# Patient Record
Sex: MALE | Race: WHITE
[De-identification: names, ages, dates, MRNs, and addresses within clinical notes are randomized per-mention and may not be internally consistent; named-entity substitution may affect disease eponyms.]

---

## 2020-01-07 ENCOUNTER — HOSPITAL ENCOUNTER (EMERGENCY)
Dept: HOSPITAL 72 - EMR | Age: 55
Discharge: HOME | End: 2020-01-07
Payer: COMMERCIAL

## 2020-01-07 VITALS — DIASTOLIC BLOOD PRESSURE: 75 MMHG | SYSTOLIC BLOOD PRESSURE: 128 MMHG

## 2020-01-07 VITALS — DIASTOLIC BLOOD PRESSURE: 94 MMHG | SYSTOLIC BLOOD PRESSURE: 132 MMHG

## 2020-01-07 VITALS — BODY MASS INDEX: 26.48 KG/M2 | WEIGHT: 185 LBS | HEIGHT: 70 IN

## 2020-01-07 DIAGNOSIS — Z88.8: ICD-10-CM

## 2020-01-07 DIAGNOSIS — N20.0: Primary | ICD-10-CM

## 2020-01-07 LAB
ADD MANUAL DIFF: NO
ALBUMIN SERPL-MCNC: 3.2 G/DL (ref 3.4–5)
ALBUMIN/GLOB SERPL: 0.7 {RATIO} (ref 1–2.7)
ALP SERPL-CCNC: 65 U/L (ref 46–116)
ALT SERPL-CCNC: 29 U/L (ref 12–78)
ANION GAP SERPL CALC-SCNC: 4 MMOL/L (ref 5–15)
APPEARANCE UR: (no result)
APTT PPP: YELLOW S
AST SERPL-CCNC: 23 U/L (ref 15–37)
BASOPHILS NFR BLD AUTO: 0.8 % (ref 0–2)
BILIRUB SERPL-MCNC: 0.2 MG/DL (ref 0.2–1)
BUN SERPL-MCNC: 16 MG/DL (ref 7–18)
CALCIUM SERPL-MCNC: 8.3 MG/DL (ref 8.5–10.1)
CHLORIDE SERPL-SCNC: 108 MMOL/L (ref 98–107)
CO2 SERPL-SCNC: 29 MMOL/L (ref 21–32)
CREAT SERPL-MCNC: 1.1 MG/DL (ref 0.55–1.3)
EOSINOPHIL NFR BLD AUTO: 2.2 % (ref 0–3)
ERYTHROCYTE [DISTWIDTH] IN BLOOD BY AUTOMATED COUNT: 12.2 % (ref 11.6–14.8)
GLOBULIN SER-MCNC: 4.4 G/DL
GLUCOSE UR STRIP-MCNC: NEGATIVE MG/DL
HCT VFR BLD CALC: 43.2 % (ref 42–52)
HGB BLD-MCNC: 14.4 G/DL (ref 14.2–18)
KETONES UR QL STRIP: NEGATIVE
LEUKOCYTE ESTERASE UR QL STRIP: NEGATIVE
LYMPHOCYTES NFR BLD AUTO: 27.2 % (ref 20–45)
MCV RBC AUTO: 88 FL (ref 80–99)
MONOCYTES NFR BLD AUTO: 6 % (ref 1–10)
NEUTROPHILS NFR BLD AUTO: 63.9 % (ref 45–75)
NITRITE UR QL STRIP: NEGATIVE
PH UR STRIP: 6 [PH] (ref 4.5–8)
PLATELET # BLD: 306 K/UL (ref 150–450)
POTASSIUM SERPL-SCNC: 3.8 MMOL/L (ref 3.5–5.1)
PROT UR QL STRIP: (no result)
RBC # BLD AUTO: 4.93 M/UL (ref 4.7–6.1)
SODIUM SERPL-SCNC: 141 MMOL/L (ref 136–145)
SP GR UR STRIP: 1.02 (ref 1–1.03)
UROBILINOGEN UR-MCNC: NORMAL MG/DL (ref 0–1)
WBC # BLD AUTO: 8.1 K/UL (ref 4.8–10.8)

## 2020-01-07 PROCEDURE — 96375 TX/PRO/DX INJ NEW DRUG ADDON: CPT

## 2020-01-07 PROCEDURE — 85025 COMPLETE CBC W/AUTO DIFF WBC: CPT

## 2020-01-07 PROCEDURE — 81003 URINALYSIS AUTO W/O SCOPE: CPT

## 2020-01-07 PROCEDURE — 74176 CT ABD & PELVIS W/O CONTRAST: CPT

## 2020-01-07 PROCEDURE — 96361 HYDRATE IV INFUSION ADD-ON: CPT

## 2020-01-07 PROCEDURE — 83690 ASSAY OF LIPASE: CPT

## 2020-01-07 PROCEDURE — 99284 EMERGENCY DEPT VISIT MOD MDM: CPT

## 2020-01-07 PROCEDURE — 36415 COLL VENOUS BLD VENIPUNCTURE: CPT

## 2020-01-07 PROCEDURE — 80053 COMPREHEN METABOLIC PANEL: CPT

## 2020-01-07 PROCEDURE — 96374 THER/PROPH/DIAG INJ IV PUSH: CPT

## 2020-01-07 NOTE — EMERGENCY ROOM REPORT
History of Present Illness


General


Chief Complaint:  Abdominal Pain


Source:  Patient





Present Illness


HPI


Patient presents emergency department today complaining of cute onset of right 

flank pain radiating to the groin starting last night.  He states that he took 

an Advil last night symptoms became better and he was able to rest became very 

severe this morning awoke about.  He complains of urinary urgency.  Denies any 

hematuria.  He states that he actually has some abdominal pain and flank pain 

about a month ago at that time received CT scan was told that he might have a 

kidney stone.  He denies any  vomiting diarrhea chills.  No other complaints 

are noted.  Symptoms noted to be severe and patient appears uncomfortable.  

Patient denies any diarrhea.  No other modifying factors.  No other associated 

signs and symptoms.  No other complaints were noted.


Allergies:  


Coded Allergies:  


     CEFPROZIL (Verified  Allergy, Unknown, 1/7/20)





Patient History


Past Medical History:  other - Ulcerative colitis.


Past Surgical History:  none


Pertinent Family History:  none


Social History:  Denies: smoking, alcohol use, drug use


Reviewed Nursing Documentation:  PMH: Agreed; PSxH: Agreed





Nursing Documentation-PMH


Past Medical History:  No History, Except For


Hx Gastrointestinal Problems:  Yes - ulcerative colitis





Review of Systems


All Other Systems:  negative except mentioned in HPI





Physical Exam





Vital Signs








  Date Time  Temp Pulse Resp B/P (MAP) Pulse Ox O2 Delivery O2 Flow Rate FiO2


 


1/7/20 08:01 97.5 60 18 154/100 (118) 99 Room Air  








Sp02 EP Interpretation:  reviewed, normal


General Appearance:  alert, moderate distress


Head:  atraumatic


Eyes:  bilateral eye normal inspection


ENT:  normal ENT inspection, hearing grossly normal, normal voice


Neck:  normal inspection, full range of motion, supple, no bony tend


Respiratory:  normal inspection, lungs clear, normal breath sounds, no 

respiratory distress, no retraction, no wheezing


Cardiovascular #1:  regular rate, rhythm, no edema


Gastrointestinal:  normal inspection, normal bowel sounds, soft, no guarding, 

no hernia, tenderness - Right flank tenderness to percussion.


Genitourinary:  no CVA tenderness


Musculoskeletal:  normal inspection, back normal, normal range of motion


Neurologic:  alert, responsive, speech normal, normal inspection


Psychiatric:  normal inspection, judgement/insight normal, anxious


Skin:  no rash





Medical Decision Making


Diagnostic Impression:  


 Primary Impression:  


 Kidney stone on right side


ER Course


Patient presents emergency department today complaint flank pain.  Differential 

considerations include kidney stones, UTI, appendicitis just to name a few.  

Given the severity of the patient's presentation I felt this is a highly 

complex patient.  This patient required extensive workup.


Patient's laboratory work-up was consistent with kidney stone CT scan shows a 4 

mm stone on the bladder.  This is likely consistent with a stone that is 

passing.  Patient was given prescription pain medications.  Patient was 

reevaluated feels much better.  Given patient is now feeling better and stone 

appears to be passing with no complications or evidence infection I feel the 

patient be discharged home.  Recommend outpatient follow-up with urology.  

Patient is advised to follow up with primary doctor in 2-3 days and return the 

emergency room for any worsening symptoms and as needed.





Labs








Test


  1/7/20


08:05 1/7/20


08:20


 


Urine Color Yellow  


 


Urine Appearance


  Slightly


cloudy 


 


 


Urine pH 6 (4.5-8.0)  


 


Urine Specific Gravity


  1.020


(1.005-1.035) 


 


 


Urine Protein 1+ (NEGATIVE)  


 


Urine Glucose (UA)


  Negative


(NEGATIVE) 


 


 


Urine Ketones


  Negative


(NEGATIVE) 


 


 


Urine Blood 5+ (NEGATIVE)  


 


Urine Nitrite


  Negative


(NEGATIVE) 


 


 


Urine Bilirubin


  Negative


(NEGATIVE) 


 


 


Urine Urobilinogen


  Normal MG/DL


(0.0-1.0) 


 


 


Urine Leukocyte Esterase


  Negative


(NEGATIVE) 


 


 


Urine RBC


  40-60 /HPF (0


- 0) 


 


 


Urine WBC 0 /HPF (0 - 0)  


 


Urine Squamous Epithelial


Cells Occasional


/LPF 


 


 


Urine Bacteria


  Occasional


/HPF (NONE) 


 


 


Urine Mucus


  Few /LPF


(NONE/OCC) 


 


 


White Blood Count


  


  8.1 K/UL


(4.8-10.8)


 


Red Blood Count


  


  4.93 M/UL


(4.70-6.10)


 


Hemoglobin


  


  14.4 G/DL


(14.2-18.0)


 


Hematocrit


  


  43.2 %


(42.0-52.0)


 


Mean Corpuscular Volume  88 FL (80-99) 


 


Mean Corpuscular Hemoglobin


  


  29.2 PG


(27.0-31.0)


 


Mean Corpuscular Hemoglobin


Concent 


  33.3 G/DL


(32.0-36.0)


 


Red Cell Distribution Width


  


  12.2 %


(11.6-14.8)


 


Platelet Count


  


  306 K/UL


(150-450)


 


Mean Platelet Volume


  


  6.2 FL


(6.5-10.1)


 


Neutrophils (%) (Auto)


  


  63.9 %


(45.0-75.0)


 


Lymphocytes (%) (Auto)


  


  27.2 %


(20.0-45.0)


 


Monocytes (%) (Auto)


  


  6.0 %


(1.0-10.0)


 


Eosinophils (%) (Auto)


  


  2.2 %


(0.0-3.0)


 


Basophils (%) (Auto)


  


  0.8 %


(0.0-2.0)


 


Sodium Level


  


  141 MMOL/L


(136-145)


 


Potassium Level


  


  3.8 MMOL/L


(3.5-5.1)


 


Chloride Level


  


  108 MMOL/L


()


 


Carbon Dioxide Level


  


  29 MMOL/L


(21-32)


 


Anion Gap


  


  4 mmol/L


(5-15)


 


Blood Urea Nitrogen


  


  16 mg/dL


(7-18)


 


Creatinine


  


  1.1 MG/DL


(0.55-1.30)


 


Estimat Glomerular Filtration


Rate 


  > 60 mL/min


(>60)


 


Glucose Level


  


  124 MG/DL


()


 


Calcium Level


  


  8.3 MG/DL


(8.5-10.1)


 


Total Bilirubin


  


  0.2 MG/DL


(0.2-1.0)


 


Aspartate Amino Transf


(AST/SGOT) 


  23 U/L (15-37) 


 


 


Alanine Aminotransferase


(ALT/SGPT) 


  29 U/L (12-78) 


 


 


Alkaline Phosphatase


  


  65 U/L


()


 


Total Protein


  


  7.6 G/DL


(6.4-8.2)


 


Albumin


  


  3.2 G/DL


(3.4-5.0)


 


Globulin  4.4 g/dL 


 


Albumin/Globulin Ratio  0.7 (1.0-2.7) 


 


Lipase


  


  230 U/L


()








CT/MRI/US Diagnostic Results


CT/MRI/US Diagnostic Results :  


   Impression


CT scan abdomen pelvis.:  Positive for 4 mm kidney stone in the bladder.





Last Vital Signs








  Date Time  Temp Pulse Resp B/P (MAP) Pulse Ox O2 Delivery O2 Flow Rate FiO2


 


1/7/20 08:13  60 18   Room Air  


 


1/7/20 08:01 97.5   154/100 (118) 99   








Status:  improved


Disposition:  HOME, SELF-CARE


Condition:  Stable


Scripts


Hydrocodone Bit/Acetaminophen 5-325* (NORCO 5-325*) 1 Each Tablet


1 TAB ORAL Q6H PRN for For Pain, #10 TAB 0 Refills


   Prov: Jack Jones MD         1/7/20 


Ibuprofen* (MOTRIN*) 600 Mg Tablet


600 MG ORAL Q8H PRN for For Pain, #20 TAB 0 Refills


   Prov: Jack Jones MD         1/7/20 


Tamsulosin HCl (Flomax) 0.4 Mg Cap.er.24h


0.4 MG ORAL DAILY for 7 Days, CAP


   Prov: Jack Jones MD         1/7/20











Jack Jones MD Jan 7, 2020 08:21

## 2020-01-07 NOTE — DIAGNOSTIC IMAGING REPORT
Indication: . Stabbing abdominal pain in the right upper quadrant radiating to right

flank and right back

 

Technique: Spiral acquisitions obtained through the abdomen and pelvis. No oral

contrast utilized, per emergency room physician request No IV contrast utilized,  per

referring physician request.. Multiplanar reconstructions were generated. Total dose

length product 1324 mGycm. CTDIvol(s) 23 mGy. Dose reduction achieved using automated

exposure control

 

 

Comparison: None

 

Findings: There is a 4 mm calculus within the bladder lumen dependently. No ureteral

calculi demonstrated. There is equivocally minimal ectasia of the right ureter and

right renal collecting system. There is a punctate nonobstructive right lower pole

intrarenal calculus. No left renal or ureteral calculi, left hydronephrosis, or

hydroureter

 

The appendix is normal. No evidence of colonic diverticulosis or diverticulitis. No

small bowel distention. No free or loculated intraperitoneal gas or fluid is evident.

 

Lack of IV contrast limits assessment of solid organs. The liver, gallbladder, bile

ducts, pancreas, spleen, adrenals are unremarkable. No pelvic mass or adenopathy. No

retroperitoneal or mesenteric mass or adenopathy. There are numerous but not frankly

enlarged retroperitoneal lymph nodes noted. There are also abundant mesenteric root

lymph nodes, particularly in the right lower quadrant

 

The included lung bases are clear. The bones demonstrate degenerative spondylosis

changes.

 

Impression: For renal calculus within the bladder lumen. Suspect that this represents

a recently passed right ureteral calculus, in view of patient's symptoms and presence

of very mild ectasia of the right renal collecting system and ureter

 

No other acute abnormality

 

Nonobstructive right lower pole intrarenal calculus

 

 

 

The CT scanner at [facility] is accredited by the [employer]

Radiology and the scans are performed using protocols designed to limit radiation

exposure to as low as reasonably achievable to attain images of sufficient resolution

adequate for diagnostic evaluation.